# Patient Record
Sex: MALE | Race: WHITE | NOT HISPANIC OR LATINO | URBAN - METROPOLITAN AREA
[De-identification: names, ages, dates, MRNs, and addresses within clinical notes are randomized per-mention and may not be internally consistent; named-entity substitution may affect disease eponyms.]

---

## 2022-02-03 ENCOUNTER — OFFICE (OUTPATIENT)
Dept: URBAN - METROPOLITAN AREA CLINIC 72 | Facility: CLINIC | Age: 77
End: 2022-02-03

## 2022-02-03 VITALS
DIASTOLIC BLOOD PRESSURE: 70 MMHG | HEIGHT: 72 IN | DIASTOLIC BLOOD PRESSURE: 72 MMHG | HEART RATE: 68 BPM | SYSTOLIC BLOOD PRESSURE: 164 MMHG | OXYGEN SATURATION: 100 % | WEIGHT: 200 LBS | SYSTOLIC BLOOD PRESSURE: 168 MMHG

## 2022-02-03 DIAGNOSIS — K52.9 NONINFECTIVE GASTROENTERITIS AND COLITIS, UNSPECIFIED: ICD-10-CM

## 2022-02-03 DIAGNOSIS — R15.9 FULL INCONTINENCE OF FECES: ICD-10-CM

## 2022-02-03 PROCEDURE — 99204 OFFICE O/P NEW MOD 45 MIN: CPT | Performed by: INTERNAL MEDICINE

## 2022-02-03 NOTE — SERVICEHPINOTES
Naresh Huntne   is seen for an initial visit today.  
emily
br   77 year old referred for diarrhea. 
br
emily Rectum sticks out and he has trouble cleaning himself.  He has fecal incontinence, watery stool.  He has stool coming out when he stools.  He has some normal BM where it is a big lump but then he will also have ongoing leakage.  He has retained stool in there as well.  br
br He had surgery on his rectum for a infection near his rectum (? fistual or abscess)
br
br he has nasuea off and it comes and goes. 
br
emily He is on plavix and xeralto.  He has A fib, he has a pacemaker.  
br
emily He had a colonoscopy about a year ago by Dr. Herman.  He was told it was normal.  Dr. Herman told him to take some antidiarrheal like immodium but that just caused him to be constipated for 3 days.  My nurse has reviewed and updated the medication list with the patient. I have reviewed the medication list along with the documented medical, social and family history. Pertinent details are also noted above in the HPI.

## 2022-02-03 NOTE — SERVICENOTES
Our goal is to partner with you to improve your health and well being. It is important for you to complete necessary testing and follow the instructions given to you at your clinic visit. Our office will call you within 2 weeks with results of any testing but you may also call sooner to obtain results - [phone].   If you have any questions or concerns please feel free to call us.  We take your care very seriously and we thank you for your trust!
- we need to obtain records from colonoscopy from DR. Herman.  If you don't hear from us in a week about that procedure please call us
- appointment with Dr. Jennings
- start daily fiber supplement (metamucil) do not get the one with artificial sweeteners